# Patient Record
Sex: MALE | Race: WHITE | NOT HISPANIC OR LATINO | Employment: FULL TIME | URBAN - METROPOLITAN AREA
[De-identification: names, ages, dates, MRNs, and addresses within clinical notes are randomized per-mention and may not be internally consistent; named-entity substitution may affect disease eponyms.]

---

## 2022-03-09 ENCOUNTER — TRANSFERRED RECORDS (OUTPATIENT)
Dept: HEALTH INFORMATION MANAGEMENT | Facility: CLINIC | Age: 27
End: 2022-03-09

## 2022-09-06 ENCOUNTER — TRANSFERRED RECORDS (OUTPATIENT)
Dept: HEALTH INFORMATION MANAGEMENT | Facility: CLINIC | Age: 27
End: 2022-09-06

## 2023-08-10 ENCOUNTER — TRANSFERRED RECORDS (OUTPATIENT)
Dept: HEALTH INFORMATION MANAGEMENT | Facility: CLINIC | Age: 28
End: 2023-08-10

## 2023-08-10 ENCOUNTER — MEDICAL CORRESPONDENCE (OUTPATIENT)
Dept: HEALTH INFORMATION MANAGEMENT | Facility: CLINIC | Age: 28
End: 2023-08-10

## 2023-08-11 ENCOUNTER — TRANSCRIBE ORDERS (OUTPATIENT)
Dept: OTHER | Age: 28
End: 2023-08-11

## 2023-08-11 ENCOUNTER — TELEPHONE (OUTPATIENT)
Dept: GASTROENTEROLOGY | Facility: CLINIC | Age: 28
End: 2023-08-11
Payer: COMMERCIAL

## 2023-08-11 DIAGNOSIS — K59.09 CHRONIC CONSTIPATION: Primary | ICD-10-CM

## 2023-08-11 DIAGNOSIS — R10.9 LEFT SIDED ABDOMINAL PAIN: ICD-10-CM

## 2023-08-11 NOTE — TELEPHONE ENCOUNTER
"Endoscopy Scheduling Screen    Have you had a positive Covid test in the last 14 days?  No    Are you active on MyChart?   No    What insurance is in the chart?  BC/BS: Schedule in ASC unless patient meets exclusion criteria.     Ordering/Referring Provider:     CORTEZ CLAY      (If ordering provider performs procedure, schedule with ordering provider unless otherwise instructed. )    BMI: There is no height or weight on file to calculate BMI.     Sedation Ordered  moderate sedation.   If patient BMI > 50 do not schedule in ASC.    Are you taking any prescription medications for pain?   No    Are you taking methadone or Suboxone?  No    Do you have a history of malignant hyperthermia or adverse reaction to anesthesia?  No    (Females) Are you currently pregnant?   No     Have you been diagnosed or told you have pulmonary hypertension?   No    Do you have an LVAD?  No    Have you been told you have moderate to severe sleep apnea?  No    Have you been told you have COPD, asthma, or any other lung disease?  No    Do you have any heart conditions?  No     Have you ever had or are you awaiting a heart or lung transplant?   No    Have you had a stroke or transient ischemic attack (TIA aka \"mini stroke\" in the last 6 months?   No    Have you been diagnosed with or been told you have cirrhosis of the liver?   No    Are you currently on dialysis?   No    Do you need assistance transferring?   No    BMI: There is no height or weight on file to calculate BMI. 23.3    Is patients BMI > 40 and scheduling location [facility]?  No    Do you take the medication Phentermine, Ozempic or Wegovy?  No    Do you take the medication Naltrexone?  No    Do you take blood thinners?  No      Prep   Are you currently on dialysis or do you have chronic kidney disease?  No    Do you have a diagnosis of diabetes?  No    Do you have a diagnosis of cystic fibrosis (CF)?  No    On a regular basis do you go 3 -5 days between bowel movements?  Yes (Extended " Prep)    BMI > 40?  No    Preferred Pharmacy:  [facility]   [address] [place] [id number]    Final Scheduling Details   Colonoscopy prep sent?  Golytely Extended Prep    Procedure scheduled  Colonoscopy    Surgeon:  CHRISS     Date of procedure:  11/16/23     Schedule PAC:   No    Location  [facility] - ASC    Sedation   Moderate Sedation    Patient Reminders:   You will receive a call from a Nurse to review instructions and health history.  This assessment must be completed prior to your procedure.  Failure to complete the Nurse assessment may result in the procedure being cancelled.      On the day of your procedure, please designate an adult(s) who can drive you home stay with you for the next 24 hours. The medicines used in the exam will make you sleepy. You will not be able to drive.      You cannot take public transportation, ride share services, or non-medical taxi service without a responsible caregiver.  Medical transport services are allowed with the requirement that a responsible caregiver will receive you at your destination.  We require that drivers and caregivers are confirmed prior to your procedure.

## 2023-08-15 ENCOUNTER — TELEPHONE (OUTPATIENT)
Dept: GASTROENTEROLOGY | Facility: CLINIC | Age: 28
End: 2023-08-15
Payer: COMMERCIAL

## 2023-08-15 ENCOUNTER — TELEPHONE (OUTPATIENT)
Dept: CALL CENTER | Age: 28
End: 2023-08-15
Payer: COMMERCIAL

## 2023-08-15 ENCOUNTER — TRANSCRIBE ORDERS (OUTPATIENT)
Dept: OTHER | Age: 28
End: 2023-08-15

## 2023-08-15 DIAGNOSIS — K59.00 CONSTIPATION: Primary | ICD-10-CM

## 2023-08-15 DIAGNOSIS — K59.09 CHRONIC CONSTIPATION: Primary | ICD-10-CM

## 2023-08-15 DIAGNOSIS — R10.9 LEFT SIDED ABDOMINAL PAIN: ICD-10-CM

## 2023-08-15 RX ORDER — BISACODYL 5 MG/1
TABLET, DELAYED RELEASE ORAL
Qty: 4 TABLET | Refills: 0 | Status: SHIPPED | OUTPATIENT
Start: 2023-08-15 | End: 2023-10-04

## 2023-08-15 NOTE — TELEPHONE ENCOUNTER
Caller: Stu  Reason for Reschedule/Cancellation (please be detailed, any staff messages or encounters to note?): Wanted earlier appt      Prior to reschedule please review:  Ordering Provider: Tal Crespo  Sedation per order: Moderate  Does patient have any ASC Exclusions, please identify?: No      Notes on Cancelled Procedure:  Procedure: Lower Endoscopy [Colonoscopy]   Date: 11/16/2023  Location: [facility]; [address] [place] [address], [place] [address]  Surgeon: TIFFANIE Oropeza      Rescheduled: Yes  Procedure: Lower Endoscopy [Colonoscopy]   Date: 8/22/2023  Location: [facility]; [address] [place] [address], [place] [address]  Surgeon: Malina  Sedation Level Scheduled  Moderate,  Reason for Sedation Level Order  Prep/Instructions updated and sent: Will sendlondon Coats when patient signs up.

## 2023-08-15 NOTE — TELEPHONE ENCOUNTER
[facility] Call Center    Phone Message    May a detailed message be left on voicemail: Yes    Reason for Call: Other: Patient is currently scheduled on 10/04/2023, as a patient New GI Urgent. This is outside the expected timeline for this schedule. Paitent has been added to the waitlist.      Pt was rescheduled due to wanting a location change.    Action Taken: Message routed to:  Other: GI REFERRAL TRIAGE POOL     Travel Screening: Not Applicable

## 2023-08-15 NOTE — TELEPHONE ENCOUNTER
Pre assessment completed for upcoming procedure.      Procedure details:    Patient scheduled for Colonoscopy  on 8/22/23.     Arrival time: 0845. Procedure time 0945    Pre op exam needed? N/A    Facility location: [facility]; [address] [place] [address], 5th Floor,  [id number]    Sedation type: Conscious sedation     Indication for procedure: chronic constipation; left sided abdominal pain    COVID policy reviewed.    Designated  policy reviewed. Instructed to have someone stay 6 hours post procedure.       Chart review:     Electronic implanted devices? No    Diabetic? No    Medication review:    Anticoagulants? No    NSAIDS? Yes.  Ibuprofen (Advil, Motrin).  Holding interval of 1 day before procedure.    Other medication HOLDING recommendations:  N/A      Prep for procedure:     Bowel prep recommendation: Extended prep Golytely    Due to: constipation noted or reported.     Prep instructions sent via [employer] Bowel prep script sent to [employer]    Reviewed procedure prep instructions.     Patient verbalized understanding and had no questions or concerns at this time.        Patricia Valladares RN  Endoscopy Procedure Pre Assessment RN  [phone] option 4

## 2023-08-16 NOTE — TELEPHONE ENCOUNTER
Writer called to help get Pt scheduled for a sooner appointment. Pt is scheduled for a colonoscopy on 8/22/2023 and would like an appointment after the procedure. However, Pt did not want to reschedule his appointment. Pt will call back to rescheduled if Pt wants a sooner appointment. Writer gave Pt Writer's direct call back number to reschedule. Closing encounter.

## 2023-08-20 ENCOUNTER — NURSE TRIAGE (OUTPATIENT)
Dept: NURSING | Facility: CLINIC | Age: 28
End: 2023-08-20
Payer: COMMERCIAL

## 2023-08-20 NOTE — TELEPHONE ENCOUNTER
Pt is having a Colonoscopy tomorrow. Pt is a [facility] patient. Pt now has a runny nose, and is wondering if he can still have the test. Pt was advised to do a Covid test and to call the Surgery Center in the morning.    Pt verbalized understanding.     Jalen Lugo RN on 8/20/2023 at 5:11 PM       Reason for Disposition   Health Information question, no triage required and triager able to answer question    Additional Information   Negative: [1] Caller is not with the adult (patient) AND [2] reporting urgent symptoms   Negative: Lab result questions   Negative: Medication questions   Negative: Caller can't be reached by phone   Negative: Caller has already spoken to PCP or another triager   Negative: RN needs further essential information from caller in order to complete triage   Negative: Requesting regular office appointment   Negative: [1] Caller requesting NON-URGENT health information AND [2] PCP's office is the best resource    Protocols used: Information Only Call - No Triage-A-

## 2023-08-22 ENCOUNTER — HOSPITAL ENCOUNTER (OUTPATIENT)
Facility: AMBULATORY SURGERY CENTER | Age: 28
Discharge: HOME | End: 2023-08-22
Attending: INTERNAL MEDICINE
Payer: COMMERCIAL

## 2023-08-22 ENCOUNTER — ANESTHESIA EVENT (OUTPATIENT)
Dept: SURGERY | Facility: AMBULATORY SURGERY CENTER | Age: 28
End: 2023-08-22
Payer: COMMERCIAL

## 2023-08-22 ENCOUNTER — ANESTHESIA (OUTPATIENT)
Dept: SURGERY | Facility: AMBULATORY SURGERY CENTER | Age: 28
End: 2023-08-22
Payer: COMMERCIAL

## 2023-08-22 VITALS
TEMPERATURE: 97.3 F | WEIGHT: 159.2 LBS | HEART RATE: 83 BPM | RESPIRATION RATE: 16 BRPM | SYSTOLIC BLOOD PRESSURE: 101 MMHG | OXYGEN SATURATION: 96 % | DIASTOLIC BLOOD PRESSURE: 67 MMHG | BODY MASS INDEX: 21.56 KG/M2 | HEIGHT: 72 IN

## 2023-08-22 VITALS — HEART RATE: 78 BPM

## 2023-08-22 LAB — COLONOSCOPY: NORMAL

## 2023-08-22 PROCEDURE — 45378 DIAGNOSTIC COLONOSCOPY: CPT

## 2023-08-22 RX ORDER — LIDOCAINE 40 MG/G
CREAM TOPICAL
Status: DISCONTINUED | OUTPATIENT
Start: 2023-08-22 | End: 2023-08-22 | Stop reason: HOSPADM

## 2023-08-22 RX ORDER — NALOXONE HYDROCHLORIDE 0.4 MG/ML
0.4 INJECTION, SOLUTION INTRAMUSCULAR; INTRAVENOUS; SUBCUTANEOUS
Status: DISCONTINUED | OUTPATIENT
Start: 2023-08-22 | End: 2023-08-23 | Stop reason: HOSPADM

## 2023-08-22 RX ORDER — ONDANSETRON 2 MG/ML
4 INJECTION INTRAMUSCULAR; INTRAVENOUS
Status: DISCONTINUED | OUTPATIENT
Start: 2023-08-22 | End: 2023-08-22 | Stop reason: HOSPADM

## 2023-08-22 RX ORDER — LIDOCAINE HYDROCHLORIDE 20 MG/ML
INJECTION, SOLUTION INFILTRATION; PERINEURAL PRN
Status: DISCONTINUED | OUTPATIENT
Start: 2023-08-22 | End: 2023-08-22

## 2023-08-22 RX ORDER — NALOXONE HYDROCHLORIDE 0.4 MG/ML
0.2 INJECTION, SOLUTION INTRAMUSCULAR; INTRAVENOUS; SUBCUTANEOUS
Status: DISCONTINUED | OUTPATIENT
Start: 2023-08-22 | End: 2023-08-23 | Stop reason: HOSPADM

## 2023-08-22 RX ORDER — ONDANSETRON 4 MG/1
4 TABLET, ORALLY DISINTEGRATING ORAL EVERY 6 HOURS PRN
Status: DISCONTINUED | OUTPATIENT
Start: 2023-08-22 | End: 2023-08-23 | Stop reason: HOSPADM

## 2023-08-22 RX ORDER — PROPOFOL 10 MG/ML
INJECTION, EMULSION INTRAVENOUS CONTINUOUS PRN
Status: DISCONTINUED | OUTPATIENT
Start: 2023-08-22 | End: 2023-08-22

## 2023-08-22 RX ORDER — SODIUM CHLORIDE, SODIUM LACTATE, POTASSIUM CHLORIDE, CALCIUM CHLORIDE 600; 310; 30; 20 MG/100ML; MG/100ML; MG/100ML; MG/100ML
INJECTION, SOLUTION INTRAVENOUS CONTINUOUS PRN
Status: DISCONTINUED | OUTPATIENT
Start: 2023-08-22 | End: 2023-08-22

## 2023-08-22 RX ORDER — FLUMAZENIL 0.1 MG/ML
0.2 INJECTION, SOLUTION INTRAVENOUS
Status: ACTIVE | OUTPATIENT
Start: 2023-08-22 | End: 2023-08-22

## 2023-08-22 RX ORDER — ONDANSETRON 2 MG/ML
4 INJECTION INTRAMUSCULAR; INTRAVENOUS EVERY 6 HOURS PRN
Status: DISCONTINUED | OUTPATIENT
Start: 2023-08-22 | End: 2023-08-23 | Stop reason: HOSPADM

## 2023-08-22 RX ORDER — DEXMEDETOMIDINE HYDROCHLORIDE 4 UG/ML
INJECTION, SOLUTION INTRAVENOUS PRN
Status: DISCONTINUED | OUTPATIENT
Start: 2023-08-22 | End: 2023-08-22

## 2023-08-22 RX ORDER — PROCHLORPERAZINE MALEATE 10 MG
10 TABLET ORAL EVERY 6 HOURS PRN
Status: DISCONTINUED | OUTPATIENT
Start: 2023-08-22 | End: 2023-08-23 | Stop reason: HOSPADM

## 2023-08-22 RX ADMIN — PROPOFOL 350 MCG/KG/MIN: 10 INJECTION, EMULSION INTRAVENOUS at 09:51

## 2023-08-22 RX ADMIN — SODIUM CHLORIDE, SODIUM LACTATE, POTASSIUM CHLORIDE, CALCIUM CHLORIDE: 600; 310; 30; 20 INJECTION, SOLUTION INTRAVENOUS at 09:48

## 2023-08-22 RX ADMIN — DEXMEDETOMIDINE HYDROCHLORIDE 12 MCG: 4 INJECTION, SOLUTION INTRAVENOUS at 09:51

## 2023-08-22 RX ADMIN — LIDOCAINE HYDROCHLORIDE 60 MG: 20 INJECTION, SOLUTION INFILTRATION; PERINEURAL at 09:51

## 2023-08-22 NOTE — H&P
"Stu Marcial  [id number]  male  27 year old      Reason for procedure/surgery: abdominal pain    There is no problem list on file for this patient.      Past Surgical History:  History reviewed. No pertinent surgical history.    Past Medical History: No past medical history on file.    Social History:   Social History     Tobacco Use    Smoking status: Not on file    Smokeless tobacco: Not on file   Substance Use Topics    Alcohol use: Not on file       Family History: History reviewed. No pertinent family history.    Allergies:   Allergies   Allergen Reactions    Aspirin Other (See Comments)     Liver problems per pt   Vitamin K        Active Medications:   Current Outpatient Medications   Medication Sig Dispense Refill    bisacodyl (DULCOLAX) 5 MG EC tablet 2 days prior to procedure, take 2 tablets at 4 pm. 1 day prior to procedure, take 2 tablets at 4 pm. For additional instructions refer to your colonoscopy prep instructions. 4 tablet 0    polyethylene glycol (GOLYTELY) 236 g suspension 2 days prior at 5pm, mix and drink half of a jug of Golytely. Drink an 8 oz. glass of Golytely every 15 minutes until half of the jug is gone. Place remainder of Golytely in the refrigerator. 1 day prior at 5 pm, drink the 2nd half of a jug of Golytely bowel prep. 6 hours before your check-in time, drink an 8 oz. glass of Golytely every 15 minutes until half of the 2nd jug of Golytely is gone. Discard remainder of second jug. 8000 mL 0       Systemic Review:   CONSTITUTIONAL: NEGATIVE for fever, chills, change in weight  ENT/MOUTH: NEGATIVE for ear, mouth and throat problems  RESP: NEGATIVE for significant cough or SOB  CV: NEGATIVE for chest pain, palpitations or peripheral edema    Physical Examination:   Vital Signs: BP (!) 137/91 (BP Location: Right arm)   Pulse 119   Temp 98.4  F (36.9  C)   Resp 15   Ht 1.83 m (6' 0.05\")   Wt 72.2 kg (159 lb 3.2 oz)   SpO2 97%   BMI 21.56 kg/m    GENERAL: healthy, alert and no " distress  NECK: no adenopathy, no asymmetry, masses, or scars  RESP: lungs clear to auscultation - no rales, rhonchi or wheezes  CV: regular rate and rhythm, normal S1 S2, no S3 or S4, no murmur, click or rub, no peripheral edema and peripheral pulses strong  ABDOMEN: soft, nontender, no hepatosplenomegaly, no masses and bowel sounds normal  MS: no gross musculoskeletal defects noted, no edema    Plan: Appropriate to proceed as scheduled.      Christiana Radford MD  8/22/2023    PCP:  No Ref-Primary, Physician

## 2023-08-22 NOTE — ANESTHESIA CARE TRANSFER NOTE
Patient: Stu Marcial    Procedure: Procedure(s):  Colonoscopy       Diagnosis: Chronic constipation [K59.09]  Left sided abdominal pain [R10.9]  Diagnosis Additional Information: No value filed.    Anesthesia Type:   MAC     Note:    Oropharynx: oropharynx clear of all foreign objects and spontaneously breathing  Level of Consciousness: awake  Oxygen Supplementation: room air    Independent Airway: airway patency satisfactory and stable  Dentition: dentition unchanged  Vital Signs Stable: post-procedure vital signs reviewed and stable  Report to RN Given: handoff report given  Patient transferred to: Phase II  Comments: Report to Phase II RN. Resps easy and regular.   Handoff Report: Identifed the Patient, Identified the Reponsible Provider, Reviewed the pertinent medical history, Discussed the surgical course, Reviewed Intra-OP anesthesia mangement and issues during anesthesia, Set expectations for post-procedure period and Allowed opportunity for questions and acknowledgement of understanding      Vitals:  Vitals Value Taken Time   /67 08/22/23 1040   Temp 36.3  C (97.3  F) 08/22/23 1040   Pulse 83 08/22/23 1040   Resp 16 08/22/23 1040   SpO2 96 % 08/22/23 1040       Electronically Signed By: VINEET LIN CRNA  August 22, 2023  10:49 AM

## 2023-08-22 NOTE — ANESTHESIA POSTPROCEDURE EVALUATION
Patient: Stu Marcial    Procedure: Procedure(s):  Colonoscopy       Anesthesia Type:  MAC    Note:  Disposition: Outpatient   Postop Pain Control: Uneventful            Sign Out: Well controlled pain   PONV: No   Neuro/Psych: Uneventful            Sign Out: Acceptable/Baseline neuro status   Airway/Respiratory: Uneventful            Sign Out: Acceptable/Baseline resp. status   CV/Hemodynamics: Uneventful            Sign Out: Acceptable CV status; No obvious hypovolemia; No obvious fluid overload   Other NRE: NONE   DID A NON-ROUTINE EVENT OCCUR? No           Last vitals:  Vitals Value Taken Time   /67 08/22/23 1040   Temp 36.3  C (97.3  F) 08/22/23 1040   Pulse 83 08/22/23 1040   Resp 16 08/22/23 1040   SpO2 96 % 08/22/23 1040       Electronically Signed By: Saw Van MD  August 22, 2023  11:19 AM

## 2023-10-04 ENCOUNTER — PRE VISIT (OUTPATIENT)
Dept: GASTROENTEROLOGY | Facility: CLINIC | Age: 28
End: 2023-10-04

## 2023-10-04 ENCOUNTER — OFFICE VISIT (OUTPATIENT)
Dept: GASTROENTEROLOGY | Facility: CLINIC | Age: 28
End: 2023-10-04
Attending: INTERNAL MEDICINE
Payer: COMMERCIAL

## 2023-10-04 VITALS
BODY MASS INDEX: 22.06 KG/M2 | HEIGHT: 72 IN | WEIGHT: 162.9 LBS | HEART RATE: 96 BPM | OXYGEN SATURATION: 96 % | SYSTOLIC BLOOD PRESSURE: 111 MMHG | DIASTOLIC BLOOD PRESSURE: 76 MMHG

## 2023-10-04 DIAGNOSIS — K59.09 CHRONIC CONSTIPATION: ICD-10-CM

## 2023-10-04 DIAGNOSIS — K92.89 GAS BLOAT SYNDROME: Primary | ICD-10-CM

## 2023-10-04 DIAGNOSIS — R10.9 LEFT SIDED ABDOMINAL PAIN: ICD-10-CM

## 2023-10-04 PROCEDURE — 99214 OFFICE O/P EST MOD 30 MIN: CPT | Performed by: INTERNAL MEDICINE

## 2023-10-04 RX ORDER — SIMETHICONE 80 MG
80 TABLET,CHEWABLE ORAL EVERY 6 HOURS PRN
COMMUNITY

## 2023-10-04 ASSESSMENT — PAIN SCALES - GENERAL: PAINLEVEL: NO PAIN (0)

## 2023-10-04 NOTE — PROGRESS NOTES
"GASTROENTEROLOGY Follow up     CC/REFERRING MD:    No Ref-Primary, Physician    HISTORY OF PRESENT ILLNESS:    Stu Marcial is a 27 year old male who is being evaluated in GI clinic today.    S/p colonoscopy 8/22/23    States for last 2-3 yrs if sits for more than an hour, gets left sided abdominal pain    Hads CT and MRI of the back in [place] 3 years ago, states these were reported as normal  Stomach makes gurgling sounds when lies down  Lying down makes the pain go away.  If eats oatmeal for breakfast then no pain - tried it for 10 days - no pain for 10 days - only issue is this constipates him - he had no BM for 10 days.   He tried oatmeal because he feels it has anti-inflammatory properties.. those 10 days ate oatmeal instead of break for breakfast  If takes gasX then he has none of this pain for a day or two.    Notices if he looks down (neck flexion) this makes the pain worse when it is there.  Notices this pain being triggered for example sitting in [place] or on the plane  Lifting heavy items does not worsen the pain  States always has constipation not worried about it feels it is a family trait  Since childhood had constipation has a BM every 6 days. States had pain and bleeding sometimes with stools and this has been happening about a year  Drinks water  Ice cream/milk worsens the pain  Jumping worsens the pain    Notes that if he is working and \"in the zone\" for 6 hours does not get the pain, until looks up and realizes it has been 6 hours and then the pain comes on.    Took miralax for one month every day, had a BM every 3 days instead of every 6 days. It did not prevent the pain episodes from happening.      Med hx/  G6PD deficiency: avoids alice beans and aspirin      Fam hx/  F and P uncle - constipation  No CRC  No colon polyps  No celiac disease  No IBD    Social/  No tob no etoh  [place]   From [place]      I have reviewed and updated the patient's Past Medical " History, Social History, Family History and Medication List.    ALLERGIES  Aspirin  Current Outpatient Medications   Medication Sig Dispense Refill    simethicone (MYLICON) 80 MG chewable tablet Take 80 mg by mouth every 6 hours as needed for flatulence or cramping         PE/  /76   Pulse 96   Ht 1.829 m (6')   Wt 73.9 kg (162 lb 14.4 oz)   SpO2 96%   BMI 22.09 kg/m    Gen: pleasant NAD  HEENT: hearing intact  Psych: AOx3, normal mood and affect    PERTINENT STUDIES have been reviewed.  8/22/23 Colonoscopy  Impression:            - The examined portion of the ileum was normal.                          - Non-bleeding internal hemorrhoids.                          - The examination was otherwise normal.                          - No specimens collected.       Impression/Recommendations:  Stu Marcial is a pleasant 27 year old male with G6PD deficiency who presents for follow up of left sided pain triggered by sitting for long periods of time, prevented by taking gas-X or eating oatmeal. Has recently completed a colonoscopy.  We discussed that ddx includes PUD vs SIBO vs celiac vs lactose intolerance (on its own or as a complication of the above) vs other.  EGD eval PUD/duodenal biopsies  Breath test eval SIBO  Labs today  No strong indication for imaging studies now    Appointment duration: 30 minutes    RTC 3 months after procedures    Thank you for this consultation.  It was a pleasure to participate in the care of this patient; please contact us with any further questions.

## 2023-10-04 NOTE — LETTER
"    10/4/2023         RE: Stu aMrcial  [address]  [facility] [address]        Dear Colleague,    Thank you for referring your patient, Stu Marcial, to the [facility] [place]. Please see a copy of my visit note below.    GASTROENTEROLOGY Follow up     CC/REFERRING MD:    No Ref-Primary, Physician    HISTORY OF PRESENT ILLNESS:    Stu Marcial is a 27 year old male who is being evaluated in GI clinic today.    S/p colonoscopy 8/22/23    States for last 2-3 yrs if sits for more than an hour, gets left sided abdominal pain    Hads CT and MRI of the back in [place] 3 years ago, states these were reported as normal  Stomach makes gurgling sounds when lies down  Lying down makes the pain go away.  If eats oatmeal for breakfast then no pain - tried it for 10 days - no pain for 10 days - only issue is this constipates him - he had no BM for 10 days.   He tried oatmeal because he feels it has anti-inflammatory properties.. those 10 days ate oatmeal instead of break for breakfast  If takes gasX then he has none of this pain for a day or two.    Notices if he looks down (neck flexion) this makes the pain worse when it is there.  Notices this pain being triggered for example sitting in [place] or on the plane  Lifting heavy items does not worsen the pain  States always has constipation not worried about it feels it is a family trait  Since childhood had constipation has a BM every 6 days. States had pain and bleeding sometimes with stools and this has been happening about a year  Drinks water  Ice cream/milk worsens the pain  Jumping worsens the pain    Notes that if he is working and \"in the zone\" for 6 hours does not get the pain, until looks up and realizes it has been 6 hours and then the pain comes on.    Took miralax for one month every day, had a BM every 3 days instead of every 6 days. It did not prevent the pain episodes from happening.      Med hx/  G6PD deficiency: " avoids alice beans and aspirin      Fam hx/  F and P uncle - constipation  No CRC  No colon polyps  No celiac disease  No IBD    Social/  No tob no etoh  [place]   From [place]      I have reviewed and updated the patient's Past Medical History, Social History, Family History and Medication List.    ALLERGIES  Aspirin  Current Outpatient Medications   Medication Sig Dispense Refill    simethicone (MYLICON) 80 MG chewable tablet Take 80 mg by mouth every 6 hours as needed for flatulence or cramping         PE/  /76   Pulse 96   Ht 1.829 m (6')   Wt 73.9 kg (162 lb 14.4 oz)   SpO2 96%   BMI 22.09 kg/m    Gen: pleasant NAD  HEENT: hearing intact  Psych: AOx3, normal mood and affect    PERTINENT STUDIES have been reviewed.  8/22/23 Colonoscopy  Impression:            - The examined portion of the ileum was normal.                          - Non-bleeding internal hemorrhoids.                          - The examination was otherwise normal.                          - No specimens collected.       Impression/Recommendations:  Stu Marcial is a pleasant 27 year old male with G6PD deficiency who presents for follow up of left sided pain triggered by sitting for long periods of time, prevented by taking gas-X or eating oatmeal. Has recently completed a colonoscopy.  We discussed that ddx includes PUD vs SIBO vs celiac vs lactose intolerance (on its own or as a complication of the above) vs other.  EGD eval PUD/duodenal biopsies  Breath test eval SIBO  Labs today  No strong indication for imaging studies now    Appointment duration: 30 minutes    RTC 3 months after procedures    Thank you for this consultation.  It was a pleasure to participate in the care of this patient; please contact us with any further questions.            Again, thank you for allowing me to participate in the care of your patient.      Sincerely,    Dahlia Oropeza MD

## 2023-10-04 NOTE — NURSING NOTE
Chief Complaint   Patient presents with    New Patient       Vitals:    10/04/23 1603   BP: 111/76   Pulse: 96   SpO2: 96%   Weight: 73.9 kg (162 lb 14.4 oz)   Height: 1.829 m (6')       Body mass index is 22.09 kg/m .    Emili Mancia

## 2023-10-05 ENCOUNTER — LAB (OUTPATIENT)
Dept: LAB | Facility: CLINIC | Age: 28
End: 2023-10-05
Payer: COMMERCIAL

## 2023-10-05 DIAGNOSIS — K92.89 GAS BLOAT SYNDROME: ICD-10-CM

## 2023-10-05 DIAGNOSIS — K59.09 CHRONIC CONSTIPATION: ICD-10-CM

## 2023-10-05 DIAGNOSIS — R10.9 LEFT SIDED ABDOMINAL PAIN: ICD-10-CM

## 2023-10-05 LAB
FERRITIN SERPL-MCNC: 354 NG/ML (ref 31–409)
FOLATE SERPL-MCNC: 11.9 NG/ML (ref 4.6–34.8)
IRON BINDING CAPACITY (ROCHE): 305 UG/DL (ref 240–430)
IRON SATN MFR SERPL: 26 % (ref 15–46)
IRON SERPL-MCNC: 78 UG/DL (ref 61–157)
VIT B12 SERPL-MCNC: 411 PG/ML (ref 232–1245)

## 2023-10-05 PROCEDURE — 99000 SPECIMEN HANDLING OFFICE-LAB: CPT | Performed by: PATHOLOGY

## 2023-10-05 PROCEDURE — 82607 VITAMIN B-12: CPT | Performed by: INTERNAL MEDICINE

## 2023-10-05 PROCEDURE — 36415 COLL VENOUS BLD VENIPUNCTURE: CPT | Performed by: PATHOLOGY

## 2023-10-05 PROCEDURE — 83550 IRON BINDING TEST: CPT | Performed by: PATHOLOGY

## 2023-10-05 PROCEDURE — 82728 ASSAY OF FERRITIN: CPT | Performed by: PATHOLOGY

## 2023-10-05 PROCEDURE — 82784 ASSAY IGA/IGD/IGG/IGM EACH: CPT | Performed by: INTERNAL MEDICINE

## 2023-10-05 PROCEDURE — 82746 ASSAY OF FOLIC ACID SERUM: CPT | Performed by: INTERNAL MEDICINE

## 2023-10-05 PROCEDURE — 86364 TISS TRNSGLTMNASE EA IG CLAS: CPT | Mod: 59 | Performed by: INTERNAL MEDICINE

## 2023-10-05 PROCEDURE — 83540 ASSAY OF IRON: CPT | Performed by: PATHOLOGY

## 2023-10-06 LAB
IGA SERPL-MCNC: 225 MG/DL (ref 84–499)
TTG IGA SER-ACNC: 0.8 U/ML
TTG IGG SER-ACNC: 0.8 U/ML

## 2023-10-22 ENCOUNTER — HEALTH MAINTENANCE LETTER (OUTPATIENT)
Age: 28
End: 2023-10-22

## 2023-10-31 ENCOUNTER — TELEPHONE (OUTPATIENT)
Dept: GASTROENTEROLOGY | Facility: CLINIC | Age: 28
End: 2023-10-31
Payer: COMMERCIAL

## 2023-10-31 NOTE — TELEPHONE ENCOUNTER
Endoscopy Scheduling Screen    SCREENING QUESTIONS COMPLETED ON 08/11/2023  Preferred Pharmacy:  [facility] - [place], [place] - [address] [place] [address] [address]  [address] [address]  Phone: [phone] Fax: [phone]      Final Scheduling Details   MESSAGE SENT TO DR. BANERJEE TO DETERMINE SCHEDULING ACCOMMODATIONS FOR PT.

## 2023-11-02 NOTE — TELEPHONE ENCOUNTER
WE RECEIVED APPROVAL TO SCHEDULE PT WITH ANY [facility] [place] [facility] WITH MAC AVAILABLE. 1ST ATTEMPT TO GET PT SCHEDULED. LVM TO CALL BACK.

## 2023-11-02 NOTE — TELEPHONE ENCOUNTER
Endoscopy Scheduling Screen  SCREENING QUESTIONS ON 08/11  Preferred Pharmacy:    [facility] - [place], [place] - [address]  [address]  [facility] [address]  Phone: [phone] Fax: [phone]      Final Scheduling Details   Colonoscopy prep sent?  N/A    Procedure scheduled  Upper endoscopy (EGD)    Surgeon:  INDIRA     Date of procedure:  02/06/2024     Pre-OP / PAC:   No - Not required for this site.    Location  [facility] - ASC - Per order.    Sedation   MAC/Deep Sedation - Per order.      Patient Reminders:   You will receive a call from a Nurse to review instructions and health history.  This assessment must be completed prior to your procedure.  Failure to complete the Nurse assessment may result in the procedure being cancelled.      On the day of your procedure, please designate an adult(s) who can drive you home stay with you for the next 24 hours. The medicines used in the exam will make you sleepy. You will not be able to drive.      You cannot take public transportation, ride share services, or non-medical taxi service without a responsible caregiver.  Medical transport services are allowed with the requirement that a responsible caregiver will receive you at your destination.  We require that drivers and caregivers are confirmed prior to your procedure.

## 2023-11-28 ENCOUNTER — TELEPHONE (OUTPATIENT)
Dept: GASTROENTEROLOGY | Facility: CLINIC | Age: 28
End: 2023-11-28
Payer: COMMERCIAL

## 2023-11-28 NOTE — TELEPHONE ENCOUNTER
Left message for patient regarding upcoming HBT. Sent instructions through [employer] as well on 10/31/2023.    Audra Garrett LPN

## 2023-12-05 ENCOUNTER — OFFICE VISIT (OUTPATIENT)
Dept: GASTROENTEROLOGY | Facility: CLINIC | Age: 28
End: 2023-12-05
Attending: INTERNAL MEDICINE
Payer: COMMERCIAL

## 2023-12-05 VITALS
BODY MASS INDEX: 20.99 KG/M2 | DIASTOLIC BLOOD PRESSURE: 67 MMHG | TEMPERATURE: 98 F | WEIGHT: 155 LBS | SYSTOLIC BLOOD PRESSURE: 100 MMHG | RESPIRATION RATE: 16 BRPM | OXYGEN SATURATION: 96 % | HEIGHT: 72 IN | HEART RATE: 93 BPM

## 2023-12-05 DIAGNOSIS — K92.89 GAS BLOAT SYNDROME: ICD-10-CM

## 2023-12-05 DIAGNOSIS — R10.9 LEFT SIDED ABDOMINAL PAIN: ICD-10-CM

## 2023-12-05 DIAGNOSIS — K59.09 CHRONIC CONSTIPATION: ICD-10-CM

## 2023-12-05 PROCEDURE — 91065 BREATH HYDROGEN/METHANE TEST: CPT

## 2023-12-05 ASSESSMENT — PAIN SCALES - GENERAL: PAINLEVEL: NO PAIN (0)

## 2023-12-05 NOTE — PATIENT INSTRUCTIONS
Hydrogen Breath Test  1. You may experience diarrhea for the next 12-24 hours.  2. Resume a regular diet.  3. Follow-up with your referring doctor in clinic.  4. If you have questions call [phone] from 7:00am-5:00pm.

## 2023-12-05 NOTE — PROGRESS NOTES
11/30/2020    Mona Rosado  [address] [place] [address]    Dear Mona,    We look forward to seeing you on _____ for your surgical procedure. In order to better prepare you for surgery, please observe the following:    Preparing for Surgery  Please schedule an appointment within 30 days of surgery with your primary care doctor for a history and physical. They will perform any required labs or tests before surgery. If your doctor is not an [facility] physician, please ask them to fax the results to [phone].    Surgery usually involves giving medications that put you to sleep or ease anxiety. For this reason, you must have a ride home from the hospital and someone (over age 16) should stay with you for the first 24 hours after surgery. Surgery is canceled if you do not have a responsible person with you. You are not allowed to drive after receiving sedation of any type.    You will receive a call from our Pre-Admission Testing department within three weeks of your surgical procedure. This call is necessary to get important information about your health history, to ensure you are properly prepared for surgery, and minimize the chance of having your procedure delayed or rescheduled.    Proper medication management is important to the safety and success of your surgery. Please talk with your doctor about which medications should be continued and which should be stopped. Follow the guidelines below unless specifically instructed otherwise by your doctor.    • Adalimumab (Humira) - stop 8 weeks prior.  • Infliximab (Remicade) - stop 6 weeks prior.  • Selegiline patches - stop 3 weeks prior.  • Ertanercept - stop 2 weeks prior.   • Phentermine and other diet pills - stop 7 days prior.     • Herbal medications and Fish Oil - stop 1 week prior.  • N-Saids such as Motrin, Aleve, Voltaren, Celebrex, Ibuprofen - stop 2 days prior.   • Anti-coagulants (a.k.a. \"blood thinners\") or Aspirin. Ask the doctor who  "Non-Invasive GI Procedure Visit    Stu Marcila is a 28 year old male with history of    Chronic constipation  Left sided abdominal pain  Gas bloat syndrome.   Patient stated reason for procedure: Bloating and Abdominal Pain      COVID-19 PCR Results           No data to display              COVID-19 Antibody Results, Testing for Immunity           No data to display                Pre-Procedure Assessment  Patient presents to clinic today for Hydrogen Breath Test    Referring Provider: Dr Dahlia Oropeza    Previous HBT: No  Is patient a smoker: No    Does patient report having a colonoscopy, barium study, barium enema or taking antibiotics within the last 2 weeks? Yes / No: No.  Does patient report taking a stool softener, fiber supplement, laxative or anti-diarrheal in the last 3 - 4 days? Yes / No: No.  Does the patient report taking a probiotic in the last 1 - 2 days? Yes / No: No.  Does the patient report taking any medications today? No    Does the patient report following the pre-procedure bland diet? Yes  Does patient report being NPO for a minimum of 12 hours before the test? Yes.     Patient reported symptoms:Abdominal Pain  How long has patient had these symptoms? 2 years    .    Patient Hx  Patient's history, medications and allergies were reviewed.     Height: 6' 0\"   Weight: 155 lbs 0 oz    There are no problems to display for this patient.     Prior to Admission medications    Medication Sig Start Date End Date Taking? Authorizing Provider   simethicone (MYLICON) 80 MG chewable tablet Take 80 mg by mouth every 6 hours as needed for flatulence or cramping    Reported, Patient     Allergies   Allergen Reactions    Aspirin Other (See Comments)     Liver problems per pt   Vitamin K      Past Medical History:   Diagnosis Date    G6PD deficiency      Past Surgical History:   Procedure Laterality Date    COLONOSCOPY N/A 8/22/2023    Procedure: Colonoscopy;  Surgeon: Christiana Radford MD;  [place] [facility] [place]     No family history on file.  Social History     Tobacco Use    Smoking status: Never    Smokeless tobacco: Never   Substance Use Topics    Alcohol use: Not on file        Pre-Procedure Education & Consent  Procedure education was provided to: Patient  Teaching method: Explanation  Barriers to learning: No Barrier    Patient indicated understanding of pre-procedure instruction and appropriate consent was obtained and documented.    ____________________________________________________________________    Post-Procedure Documentation: Hydrogen Breath Test     Baseline breath obtained prior to patient drinking Lactulose.     Patient tolerated test with no complaints.    HBT Results    Sample Clock Times Ppm H2 Ppm CH4 CO2% Comments   Baseline 0910 0 3 3.7     Challenge Dose Given 0915 - - - -   #1 0930 3 3 4.2     #2 0945 2 2 3.5     #3 1000 3 3 3.5     #4 1015 13 4 3.9     #5 1030 29 6 3.8     #6 1045 30 6 3.8     #7 1100 41 7 4.0     #8 1115 45 7 3.8     #9 1130 56 8 4.1       #  B   Patient given discharge instructions.    Notification of pending test results sent to provider for interpretation. Please reference scanned document for final interpretation of results. Patient will follow up with referring provider for test results.    Pretty Johnson RN on 12/5/2023 at 9:02 AM        prescribes your medicine if and when it should be stopped (and restarted).  • Insulin.  A nurse will give you instructions about modifying your Insulin regimen when they call you prior to your procedure. On the morning of your procedure, measure blood sugars every 2 hours. Call the pre-surgery department if greater than 180 ng/dl or less than 70 ng/dl at [phone].  • Metformin and other diabetes pills. Do not take on the day of procedure/surgery. (No Metformin should be taken within 12 hours of your procedure)  • ACE/ARB medications - (ask your doctor if you are treated for hypertension). Do not take on the night before or the day of procedure (24 hours)  • Diuretics (a.k.a. “water pills”) - do not take on the day of procedure/surgery.  • Vitamins or Iron supplements - Do not take on the day of procedure/surgery.  • Topical medications - Do not take on the day of procedure/surgery.    You may also be contacted by phone or email by a company called [employer]. This is an online educational platform that will review education regarding anesthesia and may give you additional information regarding your scheduled surgery or procedure.   Day before Surgery  The day before your surgical procedure, the surgery scheduler will contact you (between 10 a.m. and 4 p.m. Monday through Friday) to confirm the time you should arrive at the hospital the day of your surgery procedure. If you are having surgery on a Monday, you will be contacted on Friday for your confirmed time of arrival. Surgery is closed on weekends and holidays. If you have not received a call please contact the surgery scheduler at [phone].    Do not eat any food or dairy products for at least 8 hours prior to the start of your surgery or procedure start time. During this fasting time, and up until four hours prior to surgery, you may drink 8 oz. of water.     Other than a sip of water to wash down medications, DO NOT PUT ANYTHING IN YOUR MOUTH FOR AT LEAST  FOUR HOURS PRIOR TO YOUR PROCEDURE. This includes gum, candy, cigarettes, and chewing tobacco.     If your doctor gave you soap, shower the night prior and morning of your surgery following the instructions.    Day of Surgery  If you weren’t given soap, please take a shower with regular soap the day of surgery. Do not apply skin lotion, makeup, or fingernail polish. You may brush your teeth, rinse and spit afterwards. Please remember that some of the dressings that are applied after surgery can be very bulky, so wear loose-fitting clothes for comfort after surgery. Do not wear jewelry.    Take your normal morning medications with a sip of water first thing in the morning prior to surgery, except those medications your doctor or our Pre-Admission Testing office has specifically told you not to take.    Bring a photo ID and your insurance card. For your convenience we have an outpatient pharmacy on-site and can arrange to have your discharge prescription filled before you leave, to save you a trip. You may want to bring enough money to cover the cost of this prescription. Leave all other valuables at home.    Each patient that comes through the Surgical Services department is given individualized care and attention. The surgeons and nurses spend the time necessary with each patient to ensure the best care. Sometimes, this can cause delays. You may want to bring a book, puzzle or music player to keep you busy if you experience a delay.    For the comfort and privacy of all our patients before and after surgery, we strongly discourage bringing small children as guests and only allow two adult visitors in the surgical area at any given time.    If you have any questions or concerns before the day of your surgery, please call [phone] to have them answered.    After your surgical procedure you may get a survey in the mail, please take the opportunity to fill it out letting us know what we did well or what we could  improve on. Our goal is to provide our patients with the best possible care and we couldn’t do it without your input.    We look forward to seeing you,    Your Surgical Services Team

## 2023-12-05 NOTE — LETTER
"    12/5/2023         RE: Stu Marcial  [address]  [place] [facility] [address]        Dear Colleague,    Thank you for referring your patient, Stu Marcial, to the [facility] GASTRO PROCEDURE [place]. Please see a copy of my visit note below.    Non-Invasive GI Procedure Visit    Stu Marcial is a 28 year old male with history of    Chronic constipation  Left sided abdominal pain  Gas bloat syndrome.   Patient stated reason for procedure: Bloating and Abdominal Pain      COVID-19 PCR Results           No data to display              COVID-19 Antibody Results, Testing for Immunity           No data to display                Pre-Procedure Assessment  Patient presents to clinic today for Hydrogen Breath Test    Referring Provider: Dr Dahlia Oropeza    Previous HBT: No  Is patient a smoker: No    Does patient report having a colonoscopy, barium study, barium enema or taking antibiotics within the last 2 weeks? Yes / No: No.  Does patient report taking a stool softener, fiber supplement, laxative or anti-diarrheal in the last 3 - 4 days? Yes / No: No.  Does the patient report taking a probiotic in the last 1 - 2 days? Yes / No: No.  Does the patient report taking any medications today? No    Does the patient report following the pre-procedure bland diet? Yes  Does patient report being NPO for a minimum of 12 hours before the test? Yes.     Patient reported symptoms:Abdominal Pain  How long has patient had these symptoms? 2 years      Patient Hx  Patient's history, medications and allergies were reviewed.     Height: 6' 0\"   Weight: 155 lbs 0 oz    There are no problems to display for this patient.     Prior to Admission medications    Medication Sig Start Date End Date Taking? Authorizing Provider   simethicone (MYLICON) 80 MG chewable tablet Take 80 mg by mouth every 6 hours as needed for flatulence or cramping    Reported, Patient     Allergies   Allergen Reactions    Aspirin Other (See Comments) "     Liver problems per pt   Vitamin K      Past Medical History:   Diagnosis Date    G6PD deficiency      Past Surgical History:   Procedure Laterality Date    COLONOSCOPY N/A 8/22/2023    Procedure: Colonoscopy;  Surgeon: Christiana Radford MD;  Location[place] [facility] [place]     No family history on file.  Social History     Tobacco Use    Smoking status: Never    Smokeless tobacco: Never   Substance Use Topics    Alcohol use: Not on file        Pre-Procedure Education & Consent  Procedure education was provided to: Patient  Teaching method: Explanation  Barriers to learning: No Barrier    Patient indicated understanding of pre-procedure instruction and appropriate consent was obtained and documented.    ____________________________________________________________________    Post-Procedure Documentation: Hydrogen Breath Test     Baseline breath obtained prior to patient drinking Lactulose.     Patient tolerated test with no complaints.    HBT Results    Sample Clock Times Ppm H2 Ppm CH4 CO2% Comments   Baseline 0910 0 3 3.7     Challenge Dose Given 0915 - - - -   #1 0930 3 3 4.2     #2 0945 2 2 3.5     #3 1000 3 3 3.5     #4 1015 13 4 3.9     #5 1030 29 6 3.8     #6 1045 30 6 3.8     #7 1100 41 7 4.0     #8 1115 45 7 3.8     #9 1130 56 8 4.1       #  B   Patient given discharge instructions.    Notification of pending test results sent to provider for interpretation. Please reference scanned document for final interpretation of results. Patient will follow up with referring provider for test results.    Pretty Johnson RN on 12/5/2023 at 9:02 AM           Again, thank you for allowing me to participate in the care of your patient.      Sincerely,    Non-Invasive GI Nurse

## 2023-12-08 DIAGNOSIS — R10.9 LEFT SIDED ABDOMINAL PAIN: ICD-10-CM

## 2023-12-08 DIAGNOSIS — K63.8219 SMALL INTESTINAL BACTERIAL OVERGROWTH (SIBO): Primary | ICD-10-CM

## 2024-01-10 ENCOUNTER — OFFICE VISIT (OUTPATIENT)
Dept: GASTROENTEROLOGY | Facility: CLINIC | Age: 29
End: 2024-01-10
Attending: INTERNAL MEDICINE
Payer: COMMERCIAL

## 2024-01-10 VITALS
DIASTOLIC BLOOD PRESSURE: 71 MMHG | SYSTOLIC BLOOD PRESSURE: 109 MMHG | HEIGHT: 72 IN | WEIGHT: 157 LBS | BODY MASS INDEX: 21.26 KG/M2 | OXYGEN SATURATION: 98 % | HEART RATE: 73 BPM

## 2024-01-10 DIAGNOSIS — K63.8219 SMALL INTESTINAL BACTERIAL OVERGROWTH (SIBO): Primary | ICD-10-CM

## 2024-01-10 DIAGNOSIS — D75.A G6PD DEFICIENCY: ICD-10-CM

## 2024-01-10 DIAGNOSIS — R10.9 LEFT SIDED ABDOMINAL PAIN: ICD-10-CM

## 2024-01-10 DIAGNOSIS — K59.09 CHRONIC CONSTIPATION: ICD-10-CM

## 2024-01-10 DIAGNOSIS — K92.89 GAS BLOAT SYNDROME: ICD-10-CM

## 2024-01-10 PROCEDURE — 99213 OFFICE O/P EST LOW 20 MIN: CPT | Performed by: INTERNAL MEDICINE

## 2024-01-10 ASSESSMENT — PAIN SCALES - GENERAL: PAINLEVEL: NO PAIN (0)

## 2024-01-10 NOTE — LETTER
"    1/10/2024         RE: Stu Marcial  [address]  [place] [place] [facility] [address]        Dear Colleague,    Thank you for referring your patient, Stu Marcial, to the [facility] GASTROENTEROLOGY CLINIC [place]. Please see a copy of my visit note below.      GASTROENTEROLOGY Follow up     CC/REFERRING MD:    No Ref-Primary, Physician    HISTORY OF PRESENT ILLNESS:    Stu Marcial is a 28 year old male who is being evaluated in GI clinic today.    Pain x 3 years - since starting the process to apply to come to the [place], multiple exams, multiple stressors  Feels that the pain starts in the abdomen and radiates to his back.  Notes when he is busy and engaged does not notice the pain  Occurs after sitting for prolonged period    Pain relievers: oatmeal, coffee -  if has these in the morning can sit for 3 hours without pain.  Has coffee every morning, no pain then for example if goes to the [employer] that night gets the pain after sitting for a while  standing up lying down, - pain relieved in one minute.    Triggers: popcorn, milk, ice cream - feels bloated    Feels there is a narrowing in his body that gets inflamed    Oatmeal made him constipated however when he eats it consistently makes the pain go away    When takes ibuprofen this prevents the pain, for example when has a long haul flight.    Recently finished course of rifaximin, noticed that while taking it did not have constipation for the first time in years - had a BM every other day. Felt that \"something was happening\" in the LLQ however after the 2 weeks course went back to baseline .    Last week stopped whole wheat bread and noticed less pain this week.    I have reviewed and updated the patient's Past Medical History, Social History, Family History and Medication List.    ALLERGIES  Aspirin    PE/   /71   Pulse 73   Ht 1.829 m (6')   Wt 71.2 kg (157 lb)   SpO2 98%   BMI 21.29 kg/m    Gen: pleasant NAD  HEENT: hearing " intact  Psych: AOx3, normal mood and affect    PERTINENT STUDIES have been reviewed.    Impression/Recommendations:  Stu Marcial is a pleasant 28 year old male with G6PD deficiency and long-standing constipation who presents for follow up of left sided pain triggered by sitting for long periods of time, prevented by taking gas-X or eating oatmeal (however oatmeal is not a solution as it leads to increased constipation). Has recently completed a colonoscopy.  We discussed that ddx can include PUD vs SIBO vs celiac vs lactose intolerance (on its own or as a complication of the above) vs other.  Recently completed breath test, positive for SIBO, Rifaximin seemed to be helpful during the 14 day course but afterwards symptoms returned.  EGD eval PUD/duodenal biopsies - scheduled  Labs - completed  Breath test - completed, c/w SIBO  Age/labs/nature of pain relievers: low suspicion for need for cross sectional imaging study.  Future: consider repeat Rifaximin course  Consider other approaches to constipation  Consider dietary modifications (many to try)-- we discussed options - he would like to start with SIBO diet - send via [employer]   We discussed any dietary discoveries he has made regarding avoiding known triggers or eating foods which prevent pain are also good options      RTC 6 months earlier prn    Thank you for this consultation.  It was a pleasure to participate in the care of this patient; please contact us with any further questions.        Again, thank you for allowing me to participate in the care of your patient.      Sincerely,    Dahlia Oropeza MD

## 2024-01-10 NOTE — NURSING NOTE
Chief Complaint   Patient presents with    Follow Up       Vitals:    01/10/24 1450   BP: 109/71   Pulse: 73   SpO2: 98%   Weight: 71.2 kg (157 lb)   Height: 1.829 m (6')       Body mass index is 21.29 kg/m .    Emili Mancia

## 2024-01-10 NOTE — PROGRESS NOTES
"  GASTROENTEROLOGY Follow up     CC/REFERRING MD:    No Ref-Primary, Physician    HISTORY OF PRESENT ILLNESS:    Stu Marcial is a 28 year old male who is being evaluated in GI clinic today.    Pain x 3 years - since starting the process to [place] come [place] the [place], multiple exams, multiple stressors  Feels that the pain starts in the abdomen and radiates to his back.  Notes when he is busy and engaged does not notice the pain  Occurs after sitting for prolonged period    Pain relievers: oatmeal, coffee -  if has these in the morning can sit for 3 hours without pain.  Has coffee every morning, no pain then for example if goes to the [employer] that night gets the pain after sitting for a while  standing up lying down, - pain relieved in one minute.    Triggers: popcorn, milk, ice cream - feels bloated    Feels there is a narrowing in his body that gets inflamed    Oatmeal made him constipated however when he eats it consistently makes the pain go away    When takes ibuprofen this prevents the pain, for example when has a long haul flight.    Recently finished course of rifaximin, noticed that while taking it did not have constipation for the first time in years - had a BM every other day. Felt that \"something was happening\" in the LLQ however after the 2 weeks course went back to baseline .    Last week stopped whole wheat bread and noticed less pain this week.    I have reviewed and updated the patient's Past Medical History, Social History, Family History and Medication List.    ALLERGIES  Aspirin    PE/   /71   Pulse 73   Ht 1.829 m (6')   Wt 71.2 kg (157 lb)   SpO2 98%   BMI 21.29 kg/m    Gen: pleasant NAD  HEENT: hearing intact  Psych: AOx3, normal mood and affect    PERTINENT STUDIES have been reviewed.    Impression/Recommendations:  Stu Marcial is a pleasant 28 year old male with G6PD deficiency and long-standing constipation who presents for follow up of left sided pain triggered by sitting " for long periods of time, prevented by taking gas-X or eating oatmeal (however oatmeal is not a solution as it leads to increased constipation). Has recently completed a colonoscopy.  We discussed that ddx can include PUD vs SIBO vs celiac vs lactose intolerance (on its own or as a complication of the above) vs other.  Recently completed breath test, positive for SIBO, Rifaximin seemed to be helpful during the 14 day course but afterwards symptoms returned.  EGD eval PUD/duodenal biopsies - scheduled  Labs - completed  Breath test - completed, c/w SIBO  Age/labs/nature of pain relievers: low suspicion for need for cross sectional imaging study.  Future: consider repeat Rifaximin course  Consider other approaches to constipation  Consider dietary modifications (many to try)-- we discussed options - he would like to start with SIBO diet - send via [employer]   We discussed any dietary discoveries he has made regarding avoiding known triggers or eating foods which prevent pain are also good options      RTC 6 months earlier prn    Thank you for this consultation.  It was a pleasure to participate in the care of this patient; please contact us with any further questions.

## 2024-01-24 ENCOUNTER — TELEPHONE (OUTPATIENT)
Dept: GASTROENTEROLOGY | Facility: CLINIC | Age: 29
End: 2024-01-24
Payer: COMMERCIAL

## 2024-01-24 NOTE — TELEPHONE ENCOUNTER
Pre assessment completed for upcoming procedure.      Procedure details:    Patient scheduled for Upper endoscopy (EGD) on 2/6/24.     Arrival time: 1300. Procedure time 1400    Pre op exam needed? N/A    Facility location: [facility]; [address] [place] [address], [address], [place] [address]    Sedation type: MAC    Indication for procedure: Evaluate for PUD, chronic constipation, left sided abdominal pain, gas bloat syndrome.    COVID policy reviewed.    Designated  policy reviewed. Instructed to have someone stay 24 hours post procedure. Patient inquired if him and  could take an uber home. Informed patient that this is fine as long as his adult  was with him.      Chart review:     Electronic implanted devices? No    Recent diagnosis of diverticulitis within the last 6 weeks?  N/A    Diabetic? No    Medication review:    Anticoagulants? No    NSAIDS? No    Other medication HOLDING recommendations:  N/A      Prep for procedure:     Bowel prep recommendation: N/A      Prep instructions sent via [employer]     Reviewed procedure prep instructions.     Patient verbalized understanding and had no questions or concerns at this time.      Jenifer Hudson RN  Endoscopy Procedure Pre Assessment RN  [phone] option 4

## 2024-02-06 ENCOUNTER — ANESTHESIA EVENT (OUTPATIENT)
Dept: SURGERY | Facility: AMBULATORY SURGERY CENTER | Age: 29
End: 2024-02-06
Payer: COMMERCIAL

## 2024-02-06 ENCOUNTER — HOSPITAL ENCOUNTER (OUTPATIENT)
Facility: AMBULATORY SURGERY CENTER | Age: 29
Discharge: HOME | End: 2024-02-06
Attending: INTERNAL MEDICINE
Payer: COMMERCIAL

## 2024-02-06 ENCOUNTER — ANESTHESIA (OUTPATIENT)
Dept: SURGERY | Facility: AMBULATORY SURGERY CENTER | Age: 29
End: 2024-02-06
Payer: COMMERCIAL

## 2024-02-06 VITALS
OXYGEN SATURATION: 99 % | TEMPERATURE: 98.4 F | SYSTOLIC BLOOD PRESSURE: 105 MMHG | HEART RATE: 88 BPM | RESPIRATION RATE: 14 BRPM | HEIGHT: 72 IN | WEIGHT: 157 LBS | DIASTOLIC BLOOD PRESSURE: 44 MMHG | BODY MASS INDEX: 21.26 KG/M2

## 2024-02-06 VITALS — HEART RATE: 99 BPM

## 2024-02-06 LAB — UPPER GI ENDOSCOPY: NORMAL

## 2024-02-06 PROCEDURE — 88305 TISSUE EXAM BY PATHOLOGIST: CPT | Mod: 26 | Performed by: PATHOLOGY

## 2024-02-06 PROCEDURE — 88342 IMHCHEM/IMCYTCHM 1ST ANTB: CPT | Mod: TC | Performed by: INTERNAL MEDICINE

## 2024-02-06 PROCEDURE — 88342 IMHCHEM/IMCYTCHM 1ST ANTB: CPT | Mod: 26 | Performed by: PATHOLOGY

## 2024-02-06 PROCEDURE — 43239 EGD BIOPSY SINGLE/MULTIPLE: CPT | Performed by: INTERNAL MEDICINE

## 2024-02-06 RX ORDER — PROCHLORPERAZINE MALEATE 10 MG
10 TABLET ORAL EVERY 6 HOURS PRN
Status: DISCONTINUED | OUTPATIENT
Start: 2024-02-06 | End: 2024-02-07 | Stop reason: HOSPADM

## 2024-02-06 RX ORDER — NALOXONE HYDROCHLORIDE 0.4 MG/ML
0.4 INJECTION, SOLUTION INTRAMUSCULAR; INTRAVENOUS; SUBCUTANEOUS
Status: DISCONTINUED | OUTPATIENT
Start: 2024-02-06 | End: 2024-02-07 | Stop reason: HOSPADM

## 2024-02-06 RX ORDER — ONDANSETRON 2 MG/ML
4 INJECTION INTRAMUSCULAR; INTRAVENOUS
Status: DISCONTINUED | OUTPATIENT
Start: 2024-02-06 | End: 2024-02-06 | Stop reason: HOSPADM

## 2024-02-06 RX ORDER — SODIUM CHLORIDE, SODIUM LACTATE, POTASSIUM CHLORIDE, CALCIUM CHLORIDE 600; 310; 30; 20 MG/100ML; MG/100ML; MG/100ML; MG/100ML
INJECTION, SOLUTION INTRAVENOUS CONTINUOUS PRN
Status: DISCONTINUED | OUTPATIENT
Start: 2024-02-06 | End: 2024-02-06

## 2024-02-06 RX ORDER — LIDOCAINE HYDROCHLORIDE 20 MG/ML
INJECTION, SOLUTION INFILTRATION; PERINEURAL PRN
Status: DISCONTINUED | OUTPATIENT
Start: 2024-02-06 | End: 2024-02-06

## 2024-02-06 RX ORDER — ONDANSETRON 2 MG/ML
4 INJECTION INTRAMUSCULAR; INTRAVENOUS EVERY 6 HOURS PRN
Status: DISCONTINUED | OUTPATIENT
Start: 2024-02-06 | End: 2024-02-07 | Stop reason: HOSPADM

## 2024-02-06 RX ORDER — PROPOFOL 10 MG/ML
INJECTION, EMULSION INTRAVENOUS CONTINUOUS PRN
Status: DISCONTINUED | OUTPATIENT
Start: 2024-02-06 | End: 2024-02-06

## 2024-02-06 RX ORDER — FLUMAZENIL 0.1 MG/ML
0.2 INJECTION, SOLUTION INTRAVENOUS
Status: DISCONTINUED | OUTPATIENT
Start: 2024-02-06 | End: 2024-02-07 | Stop reason: HOSPADM

## 2024-02-06 RX ORDER — PROPOFOL 10 MG/ML
INJECTION, EMULSION INTRAVENOUS PRN
Status: DISCONTINUED | OUTPATIENT
Start: 2024-02-06 | End: 2024-02-06

## 2024-02-06 RX ORDER — LIDOCAINE 40 MG/G
CREAM TOPICAL
Status: DISCONTINUED | OUTPATIENT
Start: 2024-02-06 | End: 2024-02-06 | Stop reason: HOSPADM

## 2024-02-06 RX ORDER — NALOXONE HYDROCHLORIDE 0.4 MG/ML
0.2 INJECTION, SOLUTION INTRAMUSCULAR; INTRAVENOUS; SUBCUTANEOUS
Status: DISCONTINUED | OUTPATIENT
Start: 2024-02-06 | End: 2024-02-07 | Stop reason: HOSPADM

## 2024-02-06 RX ORDER — ONDANSETRON 4 MG/1
4 TABLET, ORALLY DISINTEGRATING ORAL EVERY 6 HOURS PRN
Status: DISCONTINUED | OUTPATIENT
Start: 2024-02-06 | End: 2024-02-07 | Stop reason: HOSPADM

## 2024-02-06 RX ADMIN — LIDOCAINE HYDROCHLORIDE 100 MG: 20 INJECTION, SOLUTION INFILTRATION; PERINEURAL at 13:48

## 2024-02-06 RX ADMIN — SODIUM CHLORIDE, SODIUM LACTATE, POTASSIUM CHLORIDE, CALCIUM CHLORIDE: 600; 310; 30; 20 INJECTION, SOLUTION INTRAVENOUS at 13:00

## 2024-02-06 RX ADMIN — PROPOFOL 100 MG: 10 INJECTION, EMULSION INTRAVENOUS at 13:53

## 2024-02-06 RX ADMIN — PROPOFOL 250 MCG/KG/MIN: 10 INJECTION, EMULSION INTRAVENOUS at 13:48

## 2024-02-06 NOTE — ANESTHESIA POSTPROCEDURE EVALUATION
Patient: Stu Marcial    Procedure: Procedure(s):  ESOPHAGOGASTRODUODENOSCOPY, WITH BIOPSY       Anesthesia Type:  MAC    Note:  Disposition: Outpatient   Postop Pain Control: Uneventful            Sign Out: Well controlled pain   PONV: No   Neuro/Psych: Uneventful            Sign Out: Acceptable/Baseline neuro status   Airway/Respiratory: Uneventful            Sign Out: Acceptable/Baseline resp. status   CV/Hemodynamics: Uneventful            Sign Out: Acceptable CV status; No obvious hypovolemia; No obvious fluid overload   Other NRE: NONE   DID A NON-ROUTINE EVENT OCCUR?            Last vitals:  Vitals Value Taken Time   /44 02/06/24 1420   Temp     Pulse 88 02/06/24 1420   Resp 14 02/06/24 1420   SpO2 99 % 02/06/24 1420       Electronically Signed By: Edgar Coleman MD  February 6, 2024  3:24 PM

## 2024-02-06 NOTE — H&P
Stu Marcial  [id number]  male  28 year old      Reason for procedure/surgery: abdominal pain          Past Surgical History:    Past Surgical History:   Procedure Laterality Date    COLONOSCOPY N/A 8/22/2023    Procedure: Colonoscopy;  Surgeon: Christiana Radford MD;  Location: [facility] [place]       Past Medical History:   Past Medical History:   Diagnosis Date    G6PD deficiency        Social History:   Social History     Tobacco Use    Smoking status: Never    Smokeless tobacco: Never   Substance Use Topics    Alcohol use: Not Currently       Family History: History reviewed. No pertinent family history.    Allergies:   Allergies   Allergen Reactions    Aspirin Other (See Comments)     Liver problems per pt   Vitamin K        Active Medications:   Current Outpatient Medications   Medication Sig Dispense Refill    simethicone (MYLICON) 80 MG chewable tablet Take 80 mg by mouth every 6 hours as needed for flatulence or cramping         Systemic Review:   ROS otherwise negative    Physical Examination:   Vital Signs: /76 (BP Location: Right arm)   Pulse 115   Temp 98.4  F (36.9  C) (Tympanic)   Resp 16   Ht 1.829 m (6')   Wt 71.2 kg (157 lb)   SpO2 98%   BMI 21.29 kg/m    GENERAL: healthy, alert and no distress  HENT: oropharynx clear and oral mucous membranes moist, Mallampati I  NECK: Normal ROM  RESP: lungs clear to auscultation - no rales, rhonchi or wheezes  CV: regular rate and rhythm, normal S1 S2,   ABDOMEN: soft, nontender, and bowel sounds normal  MS: no gross musculoskeletal defects noted, no edema      Plan: Appropriate to proceed as scheduled.      Karine Fu MD  2/6/2024    PCP:  No Ref-Primary, Physician

## 2024-02-06 NOTE — DISCHARGE INSTRUCTIONS
Discharge Instructions after  Upper Endoscopy (EGD)    Activity and Diet  You were given medicine for pain. You may be dizzy or sleepy.  For 24 hours:   Do not drive or use heavy equipment.   Do not make important decisions.   Do not drink any alcohol.  ___ You may return to your regular diet.    Discomfort  You may have a sore throat for 2 to 3 days. It may help to:   Avoid hot liquids for 24 hours.   Use sore throat lozenges.   Gargle as needed with salt water up to 4 times a day. Mix 1 cup of warm water  with 1 teaspoon of salt. Do not swallow.  ___ Your esophagus was dilated (opened) or banded during the exam:   Drink only cool liquids for the rest of the day. Eat a soft diet for the next few days.   You may have a sore chest for 2 to 3 days.    You may take Tylenol (acetaminophen) for pain unless your doctor has told you not to.    Do not take aspirin or ibuprofen (Advil, Motrin) or other NSAIDS  (anti-inflammatory drugs) for ___ days.    Follow-up  ___ We took small tissue samples for study. If you do not have a follow-up visit scheduled,  call your provider s office in 2 weeks for the results.    Other instructions________________________________________________________    When to call us:  Problems are rare. Call right away if you have:   Unusual throat pain or trouble swallowing   Unusual pain in belly or chest that is not relieved by belching or passing air   Black stools (tar-like looking bowel movement)   Temperature above 100.6  F. (37.5  C).    If you vomit blood or have severe pain, go to an emergency room.    If you have questions, call:  Monday to Friday, 8 a.m. to 4:30 p.m.: Central Scheduling Department:[phone]    After hours: Hospital: [phone] (Ask for the GI fellow on call)

## 2024-02-06 NOTE — ANESTHESIA CARE TRANSFER NOTE
Patient: Stu Marcial    Procedure: Procedure(s):  ESOPHAGOGASTRODUODENOSCOPY, WITH BIOPSY       Diagnosis: Chronic constipation [K59.09]  Left sided abdominal pain [R10.9]  Gas bloat syndrome [K92.89]  Diagnosis Additional Information: No value filed.    Anesthesia Type:   MAC     Note:    Oropharynx: oropharynx clear of all foreign objects and spontaneously breathing  Level of Consciousness: awake and drowsy  Oxygen Supplementation: room air    Independent Airway: airway patency satisfactory and stable  Dentition: dentition unchanged  Vital Signs Stable: post-procedure vital signs reviewed and stable  Report to RN Given: handoff report given  Patient transferred to: Phase II  Comments: VSS and WNL, comfortable, no PONV, report to [place] RN  Handoff Report: Identifed the Patient, Identified the Reponsible Provider, Reviewed the pertinent medical history, Discussed the surgical course, Reviewed Intra-OP anesthesia mangement and issues during anesthesia, Set expectations for post-procedure period and Allowed opportunity for questions and acknowledgement of understanding      Vitals:  Vitals Value Taken Time   BP     Temp     Pulse     Resp     SpO2         Electronically Signed By: VINEET Wilks CRNA  February 6, 2024  2:04 PM

## 2024-02-06 NOTE — ANESTHESIA PREPROCEDURE EVALUATION
"Anesthesia Pre-Procedure Evaluation    Patient: Stu Marcial   MRN: [id number] : 1995        Procedure : Procedure(s):  Esophagoscopy, gastroscopy, duodenoscopy (EGD), combined          Past Medical History:   Diagnosis Date    G6PD deficiency       Past Surgical History:   Procedure Laterality Date    COLONOSCOPY N/A 2023    Procedure: Colonoscopy;  Surgeon: Christiana Radford MD;  Location: [facility] OR      Allergies   Allergen Reactions    Aspirin Other (See Comments)     Liver problems per pt   Vitamin K       Social History     Tobacco Use    Smoking status: Never    Smokeless tobacco: Never   Substance Use Topics    Alcohol use: Not on file      Wt Readings from Last 1 Encounters:   01/10/24 71.2 kg (157 lb)        Anesthesia Evaluation            ROS/MED HX  ENT/Pulmonary:  - neg pulmonary ROS     Neurologic:  - neg neurologic ROS     Cardiovascular:  - neg cardiovascular ROS     METS/Exercise Tolerance:     Hematologic:  - neg hematologic  ROS     Musculoskeletal:       GI/Hepatic:  - neg GI/hepatic ROS     Renal/Genitourinary:  - neg Renal ROS     Endo: Comment: G6PD deficiency      Psychiatric/Substance Use:  - neg psychiatric ROS     Infectious Disease:       Malignancy:       Other:            Physical Exam    Airway  airway exam normal      Mallampati: I       Respiratory Devices and Support         Dental       (+) Minor Abnormalities - some fillings, tiny chips      Cardiovascular   cardiovascular exam normal       Rhythm and rate: regular and normal     Pulmonary   pulmonary exam normal        breath sounds clear to auscultation           OUTSIDE LABS:  CBC: No results found for: \"WBC\", \"HGB\", \"HCT\", \"PLT\"  BMP: No results found for: \"NA\", \"POTASSIUM\", \"CHLORIDE\", \"CO2\", \"BUN\", \"CR\", \"GLC\"  COAGS: No results found for: \"PTT\", \"INR\", \"FIBR\"  POC: No results found for: \"BGM\", \"HCG\", \"HCGS\"  HEPATIC: No results found for: \"ALBUMIN\", \"PROTTOTAL\", \"ALT\", \"AST\", \"GGT\", \"ALKPHOS\", \"BILITOTAL\", " "\"BILIDIRECT\", \"ENDY\"  OTHER: No results found for: \"PH\", \"LACT\", \"A1C\", \"KATIE\", \"PHOS\", \"MAG\", \"LIPASE\", \"AMYLASE\", \"TSH\", \"T4\", \"T3\", \"CRP\", \"SED\"    Anesthesia Plan    ASA Status:  2    NPO Status:  NPO Appropriate    Anesthesia Type: MAC.     - Reason for MAC: straight local not clinically adequate   Induction: Intravenous.   Maintenance: TIVA.        Consents    Anesthesia Plan(s) and associated risks, benefits, and realistic alternatives discussed. Questions answered and patient/representative(s) expressed understanding.     - Discussed: Risks, Benefits and Alternatives for BOTH SEDATION and the PROCEDURE were discussed     - Discussed with:  Patient            Postoperative Care       PONV prophylaxis: Background Propofol Infusion, Ondansetron (or other 5HT-3)     Comments:               Edgar Coleman MD    I have reviewed the pertinent notes and labs in the chart from the past 30 days and (re)examined the patient.  Any updates or changes from those notes are reflected in this note.                  "

## 2024-02-09 LAB
PATH REPORT.ADDENDUM SPEC: NORMAL
PATH REPORT.COMMENTS IMP SPEC: NORMAL
PATH REPORT.FINAL DX SPEC: NORMAL
PATH REPORT.GROSS SPEC: NORMAL
PATH REPORT.MICROSCOPIC SPEC OTHER STN: NORMAL
PATH REPORT.RELEVANT HX SPEC: NORMAL
PHOTO IMAGE: NORMAL

## 2024-12-15 ENCOUNTER — HEALTH MAINTENANCE LETTER (OUTPATIENT)
Age: 29
End: 2024-12-15

## (undated) DEVICE — SPECIMEN CONTAINER 3OZ W/FORMALIN 59901

## (undated) DEVICE — SOL WATER IRRIG 500ML BOTTLE 2F7113

## (undated) DEVICE — GOWN IMPERVIOUS 2XL BLUE

## (undated) DEVICE — SUCTION MANIFOLD NEPTUNE 2 SYS 1 PORT 702-025-000

## (undated) DEVICE — SNARE CAPIVATOR ROUND COLD SNR BX10 M00561101

## (undated) DEVICE — ENDO BITE BLOCK ADULT OMNI-BLOC

## (undated) DEVICE — SUCTION CATH AIRLIFE TRI-FLO W/CONTROL PORT 14FR  T60C

## (undated) DEVICE — KIT ENDO TURNOVER/PROCEDURE CARRY-ON 101822

## (undated) DEVICE — GLOVE EXAM NITRILE LG PF LATEX FREE 5064

## (undated) DEVICE — TUBING SUCTION MEDI-VAC 1/4"X20' N620A

## (undated) DEVICE — ENDO FORCEP BX CAPTURA PRO SPIKE G50696

## (undated) DEVICE — SYR 30ML SLIP TIP W/O NDL 302833